# Patient Record
Sex: FEMALE | Race: WHITE
[De-identification: names, ages, dates, MRNs, and addresses within clinical notes are randomized per-mention and may not be internally consistent; named-entity substitution may affect disease eponyms.]

---

## 2019-03-13 ENCOUNTER — HOSPITAL ENCOUNTER (INPATIENT)
Dept: HOSPITAL 62 - LC | Age: 34
LOS: 1 days | Discharge: HOME | End: 2019-03-14
Attending: OBSTETRICS & GYNECOLOGY | Admitting: OBSTETRICS & GYNECOLOGY
Payer: COMMERCIAL

## 2019-03-13 DIAGNOSIS — Z28.21: ICD-10-CM

## 2019-03-13 DIAGNOSIS — D64.9: ICD-10-CM

## 2019-03-13 DIAGNOSIS — Z3A.37: ICD-10-CM

## 2019-03-13 LAB
%HYPO/RBC NFR BLD AUTO: (no result) %
ADD MANUAL DIFF: (no result)
ANISOCYTOSIS BLD QL SMEAR: (no result)
APPEARANCE UR: (no result)
APTT PPP: YELLOW S
BARBITURATES UR QL SCN: NEGATIVE
BASOPHILS # BLD AUTO: 0.1 10^3/UL (ref 0–0.2)
BASOPHILS NFR BLD AUTO: 1.1 % (ref 0–2)
BILIRUB UR QL STRIP: NEGATIVE
EOSINOPHIL # BLD AUTO: 0.1 10^3/UL (ref 0–0.6)
EOSINOPHIL NFR BLD AUTO: 0.7 % (ref 0–6)
ERYTHROCYTE [DISTWIDTH] IN BLOOD BY AUTOMATED COUNT: 17.4 % (ref 11.5–14)
GLUCOSE UR STRIP-MCNC: NEGATIVE MG/DL
HCT VFR BLD CALC: 26.3 % (ref 36–47)
HGB BLD-MCNC: 8.6 G/DL (ref 12–15.5)
KETONES UR STRIP-MCNC: NEGATIVE MG/DL
LYMPHOCYTES # BLD AUTO: 2.8 10^3/UL (ref 0.5–4.7)
LYMPHOCYTES NFR BLD AUTO: 26.7 % (ref 13–45)
MCH RBC QN AUTO: 21.2 PG (ref 27–33.4)
MCHC RBC AUTO-ENTMCNC: 32.7 G/DL (ref 32–36)
MCV RBC AUTO: 65 FL (ref 80–97)
METHADONE UR QL SCN: NEGATIVE
MONOCYTES # BLD AUTO: 1.1 10^3/UL (ref 0.1–1.4)
MONOCYTES NFR BLD AUTO: 10.6 % (ref 3–13)
NEUTROPHILS # BLD AUTO: 6.3 10^3/UL (ref 1.7–8.2)
NEUTS SEG NFR BLD AUTO: 60.9 % (ref 42–78)
NITRITE UR QL STRIP: NEGATIVE
OVALOCYTES BLD QL SMEAR: (no result)
PCP UR QL SCN: NEGATIVE
PH UR STRIP: 8 [PH] (ref 5–9)
PLATELET # BLD: 209 10^3/UL (ref 150–450)
PLATELET COMMENT: ADEQUATE
POIKILOCYTOSIS BLD QL SMEAR: (no result)
POLYCHROMASIA BLD QL SMEAR: SLIGHT
PROT UR STRIP-MCNC: 30 MG/DL
RBC # BLD AUTO: 4.06 10^6/UL (ref 3.72–5.28)
SP GR UR STRIP: 1.01
TOTAL CELLS COUNTED % (AUTO): 100 %
URINE AMPHETAMINES SCREEN: NEGATIVE
URINE BENZODIAZEPINES SCREEN: NEGATIVE
URINE COCAINE SCREEN: NEGATIVE
URINE MARIJUANA (THC) SCREEN: NEGATIVE
UROBILINOGEN UR-MCNC: NEGATIVE MG/DL (ref ?–2)
WBC # BLD AUTO: 10.3 10^3/UL (ref 4–10.5)

## 2019-03-13 PROCEDURE — 86900 BLOOD TYPING SEROLOGIC ABO: CPT

## 2019-03-13 PROCEDURE — 81005 URINALYSIS: CPT

## 2019-03-13 PROCEDURE — 80307 DRUG TEST PRSMV CHEM ANLYZR: CPT

## 2019-03-13 PROCEDURE — 4A1HXCZ MONITORING OF PRODUCTS OF CONCEPTION, CARDIAC RATE, EXTERNAL APPROACH: ICD-10-PCS | Performed by: OBSTETRICS & GYNECOLOGY

## 2019-03-13 PROCEDURE — 86901 BLOOD TYPING SEROLOGIC RH(D): CPT

## 2019-03-13 PROCEDURE — 84112 EVAL AMNIOTIC FLUID PROTEIN: CPT

## 2019-03-13 PROCEDURE — 90715 TDAP VACCINE 7 YRS/> IM: CPT

## 2019-03-13 PROCEDURE — 36415 COLL VENOUS BLD VENIPUNCTURE: CPT

## 2019-03-13 PROCEDURE — 85027 COMPLETE CBC AUTOMATED: CPT

## 2019-03-13 PROCEDURE — 86592 SYPHILIS TEST NON-TREP QUAL: CPT

## 2019-03-13 PROCEDURE — 85025 COMPLETE CBC W/AUTO DIFF WBC: CPT

## 2019-03-13 PROCEDURE — 86850 RBC ANTIBODY SCREEN: CPT

## 2019-03-13 RX ADMIN — FERROUS SULFATE TAB 325 MG (65 MG ELEMENTAL FE) SCH MG: 325 (65 FE) TAB at 17:34

## 2019-03-13 RX ADMIN — DOCUSATE SODIUM SCH MG: 100 CAPSULE, LIQUID FILLED ORAL at 17:33

## 2019-03-13 RX ADMIN — Medication SCH: at 12:38

## 2019-03-13 RX ADMIN — IBUPROFEN SCH MG: 800 TABLET, FILM COATED ORAL at 14:08

## 2019-03-13 RX ADMIN — DOCUSATE SODIUM SCH: 100 CAPSULE, LIQUID FILLED ORAL at 12:37

## 2019-03-13 RX ADMIN — FAMOTIDINE SCH MG: 20 TABLET, FILM COATED ORAL at 22:24

## 2019-03-13 RX ADMIN — SENNOSIDES, DOCUSATE SODIUM SCH: 50; 8.6 TABLET, FILM COATED ORAL at 12:38

## 2019-03-13 RX ADMIN — FERROUS SULFATE TAB 325 MG (65 MG ELEMENTAL FE) SCH: 325 (65 FE) TAB at 12:37

## 2019-03-13 RX ADMIN — FAMOTIDINE SCH: 20 TABLET, FILM COATED ORAL at 12:37

## 2019-03-13 RX ADMIN — IBUPROFEN SCH MG: 800 TABLET, FILM COATED ORAL at 22:24

## 2019-03-13 NOTE — DELIVERY SUMMARY
=================================================================

Del Sum A-C

=================================================================

Datetime Report Generated by CPN: 2019 12:19

   

   

=================================================================

DELIVERY PERSONNEL

=================================================================

   

DELIVERY PERSONNEL:  [id number]

Delivery Doctor::  Keira Elizondo MD

Labor and Delivery Nurse::  KAILA Sherwood

Labor and Delivery Nurse::  Shannon Elizondo RN

Student Observers::  Devendra Roajs RN

Scrub Tech/CNA:  Smitha Calderónillo, CST

   

=================================================================

MATERNAL INFORMATION

=================================================================

   

Delivery Anesthesia:  Epidural

Medications After Delivery:  Pitocin Drip 20 Units/1000ml NSS

Meds After Delivery Comment:  Pitocin 20 units in 1 L NS bolusing per

   order

Estimated  Blood Loss (ml):  200

Maternal Complications:  Precipitous Labor (<3hrs)

   

=================================================================

LABOR SUMMARY

=================================================================

   

EDC:  2019 00:00

No. Babies in Womb:  1

 Attempted:  No

Labor Anesthesia:  None

   

=================================================================

LABOR INFORMATION

=================================================================

   

Reason for Induction:  Not Applicable

Onset of Labor:  2019 03:00

Complete Dilatation:  2019 02:30

Oxytocin:  N/A

Group B Beta Strep:  Negative

Antibiotics # of Doses:  0

Antibiotics Time of Last Dose:  N/A

Name of Antibiotic Given:  N/A

Steroids Given:  None

Reason Steroids Not Administered:  Not Applicable

   

=================================================================

MEMBRANES

=================================================================

   

Membranes Rupture Method:  Spontaneous

Rupture of Membranes:  2019 02:30

Length of Rupture (hr):  5.32

Amniotic Fluid Color:  Clear

Amniotic Fluid Amount:  Moderate

Amniotic Fluid Odor:  Normal

   

=================================================================

STAGES OF LABOR

=================================================================

   

Stage 1 hr:  0

Stage 1 min:  -30

Stage 2 hr:  5

Stage 2 min:  19

Stage 3 hr:  0

Stage 3 min:  4

Total Time in Labor hr:  4

Total Time in Labor min:  53

   

=================================================================

VAGINAL DELIVERY

=================================================================

   

Episiotomy:  None

Laceration #1:  None

Laceration Extension #1:  N/A

Laceration Repair:  Not Applicable

Sponge Count Correct:  Yes

Sharps Count Correct:  Yes

   

=================================================================

CSECTION DELIVERY

=================================================================

   

Primary Indication:  N/A

Secondary Indication:  N/A

CSection Incidence:  N/A

Labor:  N/A

Elective:  N/A

CSection Incision:  N/A

   

=================================================================

BABY A INFORMATION

=================================================================

   

Infant Delivery Date/Time:  2019 07:49

Method of Delivery:  Vaginal

Born in Route :  No

:  N/A

Forceps:  N/A

Vacuum Extraction:  N/A

Shoulder Dystocia :  No

   

=================================================================

PRESENTATION/POSITION BABY A

=================================================================

   

Presentation:  Cephalic

Cephalic Presentation:  Vertex

Vertex Position:  Right Occipital Anterior

Breech Presentation:  N/A

   

=================================================================

PLACENTA INFORMATION BABY A

=================================================================

   

Placenta Delivery Time :  2019 07:53

Placenta Method of Delivery:  Spontaneous

Placenta Status:  Delivered

   

=================================================================

APGAR SCORES BABY A

=================================================================

   

Heart Rate 1 min:  >100 bpm

Resp Effort 1 min:  Good Cry

Reflex Irritability 1 min:  Cough or Sneeze or Pulls Away

Muscle Tone 1 min:  Active Motion

Color 1 min:  Blue/Pale

Resuscitation Effort 1 min:  Tactile Stimulation

APGAR SCORE 1 MIN:  8

Heart Rate 5 min:  >100 bpm

Resp Effort 5 min:  Good Cry

Reflex Irritability 5 min:  Cough or Sneeze or Pulls Away

Muscle Tone 5 min:  Active Motion

Color 5 min:  Body Pink, Extremities Blue

Resuscitation Effort 5 min:  N/A

APGAR SCORE 5 MIN:  9

   

=================================================================

INFANT INFORMATION BABY A

=================================================================

   

Gestational Age at Delivery:  37.6

Gestational Status:  Early Term- 37- 38.6 Weeks

Infant Outcome :  Liveborn

Infant Condition :  Stable

Infant Sex:  Female

   

=================================================================

IDENTIFICATION BABY A

=================================================================

   

Infant Verification Date/Time:  2019 08:25

ID Band Number:  [id number]

Mother's Name Verified:  Yes

Infant Medical Record Number:  [id number]

RN Verifying Infant:  lanreyolie blanton rnc

Additional Verifying Personnel:  devendra rojas rn

   

=================================================================

WEIGHT/LENGTH BABY A

=================================================================

   

Infant Birthweight (gm):  3470

Infant Weight (lb):  7

Infant Weight (oz):  10

Infant Length (in):  19.75

Infant Length (cm):  50.17

   

=================================================================

CORD INFORMATION BABY A

=================================================================

   

No. Cord Vessels:  3

Nuchal Cord :  N/A

Cord Blood Taken:  Yes-For Storage (Mom's Blood type +)

Infant Suction:  None

   

=================================================================

ASSESSMENT BABY A

=================================================================

   

Infant Complications:  None

Physical Findings at Delivery:  Within Normal Limits

Infant Respirations:  Appears Normal

Skin to Skin:  Yes

Skin to Skin Time (min):  45

Neonatologist/ALS Called :  No

Infant Care By:  ALO Elizondo RN

Transferred To:  Remains with Mother

   

=================================================================

BABY B INFORMATION

=================================================================

   

 :  N/A

   

=================================================================

SIGNATURES

=================================================================

   

:  I was personally available for consultation and serving as

   supervising physician for the MLP.

## 2019-03-13 NOTE — WARNING SIGNS IN BABIES
=================================================================

VOD Warning Signs

=================================================================

Datetime Report Generated by [facility]: 03/13/2019 08:25

   

VOD#608 -Warning Signs in Babies:  Viewed with Parent(s)/Family   

   (01/16/2019 10:30:KAILA Sherwood)

## 2019-03-13 NOTE — ADMISSION PHYSICAL
=================================================================



=================================================================

Datetime Report Generated by CPN: 2019 05:38

   

   

=================================================================

CURRENT ADMISSION

=================================================================

   

Chief Complaint:  Uterine Contractions; Suspected Ruptured Membranes

Indication for Induction:  Not Applicable

Admit Impression :  Term, Intrauterine Pregnancy; Active Labor;

   Ruptured Membranes

Admit Plan:  Admit to Unit; Initiate Labor Protocol

   

=================================================================

ALLERGIES

=================================================================

   

Medication Allergies:  No

Medication Allergies:  No Known Allergies (2019)

Latex:  Unknown

Food Allergies:  None

Environmental Allergies:  none

   

=================================================================

OBSTETRICAL HISTORY

=================================================================

   

EDC:  2019 00:00

:  4

Para:  2

Term:  0

:  2

SAB:  1

IAB:  0

Ectopic:  0

Livin

Cesareans:  0

VBACs:  0

Multiple Births:  0

Gestational Diabetes:  No

Rh Sensitization:  No

Incompetent Cervix:  No

SHEREE:  No

Infertility:  No

ART Treatment:  No

Uterine Anomaly:  No

IUGR:  No

Hx Previous C/S:  No

Macrosomia:  No

Hx Loss/Stillborn:  No

PIH:  No

Hx  Death:  No

Placenta Previa/Abruption:  No

Depression/PP Depression:  Yes

PTL/PROM:  Yes

Post Partum Hemorrhage:  No

Current Pregnancy Procedures:  Ultrasound; NST

Obstetrical History Comments:  G1- , SAB

   G2- , 34 weeks, PTL, PROM, PTD, NICU x 1 month

   G3- , 32 weeks, PTL, PROM, PTD

   G4- Current

   

=================================================================

***SEE PRENATAL RECORDS***

=================================================================

   

Alcohol:  No

Marijuana :  No

Cocaine:  No

Other Illicit Drugs:  No

Cigarettes:  Never Smoker. [id number]

   

=================================================================

MEDICAL HISTORY

=================================================================

   

Diabetes:  No

Blood Transfusion:  No

Pulmonary Disease (Asthma, TB):  No

Breast Disease:  No

Hypertension:  No

Gyn Surgery:  No

Heart Disease:  No

Hosp/Surgery:  No

Autoimmune Disorder:  No

Anesthetic Complications:  No

Kidney Disease:  No

Abnormal Pap Smear:  No

Neuro/Epilepsy:  No

Psychiatric Disorders:  No

Other Medical Diseases:  No

Hepatitis/Liver Disease:  No

Significant Family History:  No

Varicosities/Phlebitis:  No

Trauma/Violence :  No

Thyroid Dysfunction:  Yes

Medical History Comments:  Depression 2016, Thyroid elevated TSH and on

   synthroid, Hospitalizations for childbirth.

   

=================================================================

INFECTIOUS HISTORY

=================================================================

   

Gonorrhea:  No

Genital Herpes:  No

Chlamydia:  No

Tuberculosis:  No

Syphilis:  No

Hepatitis:  No

HIV/AIDS Exposure:  No

Rash or Viral Illness:  No

HPV:  No

   

=================================================================

PHYSICAL EXAM

=================================================================

   

General:  Normal

HEENT:  Normal

Neurologic:  Normal

Thyroid:  Normal

Heart:  Normal

Lungs:  Normal

Breast:  Normal

Back:  Normal

Abdomen:  Normal

Genitourinary Exam:  Normal

Extremities:  Normal

DTRs:  Normal

Pelvic Type:  Adequate

Physical Exam Comments:  pelvis proven to 5#9

Vital Signs:  Reviewed; Within Normal Limits

   

=================================================================

VAGINAL EXAM

=================================================================

   

Dilatation:  7

Effacement:  90

Station:  -1

Contraction Comments:  q4-6 mins

   

=================================================================

MEMBRANES

=================================================================

   

Pooling:  Positive

Membranes:  Ruptured

Amniotic Fluid Color:  Clear

   

=================================================================

FETUS A

=================================================================

   

EGA:  37.6

Monitoring:  External US

FHR- Baseline:  130

Variability:  Moderate 6-25bpm

Accelerations:  15X15

Decelerations:  None

FHR Category:  Category I

Estimated Fetal Weight (gm):  3500

Fetal Presentation:  Vertex

Fetal Presentation- Other:  by sono

Admit Comment:   with previous  deliveries at 19w 32w and

   34w presented at 29+6 with contractions q2 mins, painful. she was

   given procardia 10mg po, terbutaline 0.25 subcutaneously and

   betamethasone 12mg IM at 1255. c/w dr newman-pt started on magnesium

   sulfate for PTL and neurophophylaxis. after magnesium started,

   contractions spaced to 6mins apart and decreased pain. GBS culture

   sent to lab. 

   

=================================================================

PLANS FOR LABOR AND DELIVERY

=================================================================

   

Labor and Delivery:  None

Pain Management:  Natural

Feeding Preference:  Formula

Benefit of Breast Feed Discussed:  Yes

Circumcision:  N/A

   

=================================================================

INFORMED CONSENT

=================================================================

   

Assignment:  Lisa Newman MD

Signature:  Electronically signed by Keira Elizondo MD (DEWAYNE) on

   3/13/2019 at 05:37  with User ID: [email or web address]

:  Electronically signed by Keira Elizondo MD (DEWAYNE) on 3/13/2019 at

   05:37  with User ID: [email or web address]

## 2019-03-13 NOTE — WARNING SIGNS IN BABIES
=================================================================

VOD Warning Signs

=================================================================

Datetime Report Generated by [facility]: 03/13/2019 09:02

   

VOD#608 -Warning Signs in Babies:  Viewed with Parent(s)/Family   

   (03/13/2019 07:42:KAILA Sherwood)

## 2019-03-14 VITALS — DIASTOLIC BLOOD PRESSURE: 62 MMHG | SYSTOLIC BLOOD PRESSURE: 124 MMHG

## 2019-03-14 LAB
ERYTHROCYTE [DISTWIDTH] IN BLOOD BY AUTOMATED COUNT: 17.7 % (ref 11.5–14)
HCT VFR BLD CALC: 24.7 % (ref 36–47)
HGB BLD-MCNC: 7.9 G/DL (ref 12–15.5)
MCH RBC QN AUTO: 20.9 PG (ref 27–33.4)
MCHC RBC AUTO-ENTMCNC: 31.9 G/DL (ref 32–36)
MCV RBC AUTO: 66 FL (ref 80–97)
PLATELET # BLD: 153 10^3/UL (ref 150–450)
RBC # BLD AUTO: 3.77 10^6/UL (ref 3.72–5.28)
WBC # BLD AUTO: 11.9 10^3/UL (ref 4–10.5)

## 2019-03-14 PROCEDURE — 3E0234Z INTRODUCTION OF SERUM, TOXOID AND VACCINE INTO MUSCLE, PERCUTANEOUS APPROACH: ICD-10-PCS | Performed by: OBSTETRICS & GYNECOLOGY

## 2019-03-14 RX ADMIN — IBUPROFEN SCH MG: 800 TABLET, FILM COATED ORAL at 14:46

## 2019-03-14 RX ADMIN — DOCUSATE SODIUM SCH MG: 100 CAPSULE, LIQUID FILLED ORAL at 18:14

## 2019-03-14 RX ADMIN — FERROUS SULFATE TAB 325 MG (65 MG ELEMENTAL FE) SCH MG: 325 (65 FE) TAB at 18:14

## 2019-03-14 RX ADMIN — DOCUSATE SODIUM SCH MG: 100 CAPSULE, LIQUID FILLED ORAL at 09:26

## 2019-03-14 RX ADMIN — FERROUS SULFATE TAB 325 MG (65 MG ELEMENTAL FE) SCH MG: 325 (65 FE) TAB at 09:26

## 2019-03-14 RX ADMIN — FAMOTIDINE SCH MG: 20 TABLET, FILM COATED ORAL at 09:26

## 2019-03-14 RX ADMIN — IBUPROFEN SCH MG: 800 TABLET, FILM COATED ORAL at 05:16

## 2019-03-14 RX ADMIN — SENNOSIDES, DOCUSATE SODIUM SCH EACH: 50; 8.6 TABLET, FILM COATED ORAL at 09:27

## 2019-03-14 RX ADMIN — Medication SCH CAP: at 09:26

## 2019-03-14 NOTE — PDOC PROGRESS REPORT
Subjective-OB


Progress Note for:: 19 - PP day #1, A+ Rubella Immune, bottlefeeding, 

anemia postpartum. Pt refused iron transfussion ordered by Dr Johnson today.


Subjective: 





Pt states she would like to go home today





Physical Exam (OB)


Vital Signs: 


                                        











Temp Pulse Resp BP Pulse Ox


 


 98.5 F   65   16   117/75   98 


 


 19 08:01  19 08:01  19 08:01  19 08:01  19 08:01








                                 Intake & Output











 03/13/19 03/14/19 03/15/19





 06:59 06:59 06:59


 


Weight 83.5 kg  














- General


General Appearance: Appears well, Alert


In distress: None





- PIH/Pre-Eclampsia


DTR's: 2 +


Clonus: Negative


Headache: Absent


Epigastric Pain: No


Visual Changes: No





- Lochia


Lochia Amount: Small 10-25 ml


Lochia Color: Rubra/Red





- Abdomen


Description: Soft, Round


Hernia Present: No


Fundal Description: Firm, Midline


Fundal Height: u/u - u/2





- Respiratory


Respiratory Status: No respiratory distress





- Abdominal


Inspection: Normal


Distension: No distension





- Genitourinary


Genitourinary Note: 





voiding





- Extremities


Upper extremity: Normal inspection


Lower extremities: Normal inspection





- Neurological


Cognition: Normal


Orientation: AAOx4





- Psychological


Associated symptoms: Normal affect, Normal mood





- Skin


Skin Temperature: Warm


Skin Moisture: Dry





Objective-Diagnostic


Laboratory: 


                                        





                                 19 07:20 





                                        











  19





  07:20


 


WBC  11.9 H


 


RBC  3.77


 


Hgb  7.9 L


 


Hct  24.7 L


 


MCV  66 L


 


MCH  20.9 L


 


MCHC  31.9 L


 


RDW  17.7 H


 


Plt Count  153














Assessment and Plan(PN)





- Assessment and Plan


(1)  (normal spontaneous vaginal delivery)


Is this a current diagnosis for this admission?: Yes   





(2) Anemia affecting pregnancy


Qualifiers: 


   Trimester: third trimester   Qualified Code(s): O99.013 - Anemia complicating

pregnancy, third trimester   


Is this a current diagnosis for this admission?: Yes   





- Time Spent with Patient


Time with patient: Less than 15 minutes - Will not send pt home today since no 

iron infusion was done. Will see how pt tolerates her low hemaglobin today, then

plan to d/c home tomorrow. Plan reviewed w/ Dr Johnson.


Medications reviewed and adjusted accordingly: Yes





- Disposition


Anticipated Discharge: Home


Within: within 24 hours

## 2019-03-18 NOTE — PDOC DISCHARGE SUMMARY
General





- Admit/Disc Date/PCP


Admission Date/Primary Care Provider: 


  19 04:32





  





Discharge Date: 19





- Discharge Diagnosis


(1) Anemia affecting pregnancy


Is this a current diagnosis for this admission?: Yes   





(2)  (normal spontaneous vaginal delivery)


Is this a current diagnosis for this admission?: Yes   





(3)  uterine contractions in second trimester, antepartum


Is this a current diagnosis for this admission?: No   





- Additional Information


Discharge Diet: Regular


Discharge Activity: Activity As Tolerated, No Lifting Over 10 Pounds, No Lifting

/Push/Pulling


Home Medications: 








Levothyroxine Sodium [Synthroid 0.075 mg Tablet] 0.075 mg PO DAILY 19 


Pantoprazole Sodium [Protonix] 40 mg PO DAILY 19 


Prenatal Vit No.130/Iron/Folic [Prenatal Vitamins] 1 each PO DAILY 19 











History of Present Illness


Patient complains of: contractions


History of Present Illness: 


KIRA SAHA is a 33 year old  presented to L&D c/o contractions. She 

has an IUP a4 37.6 weeks.  She believes that her water may have broken.








Physical Exam





- Physical Exam


Vital Signs: 


                                        











Temp Pulse Resp BP Pulse Ox


 


 98.5 F   65   16   124/62   98 


 


 19 20:35  19 20:35  19 20:35  19 20:35  19 20:35











General appearance: PRESENT: no acute distress


Respiratory exam: PRESENT: clear to auscultation sarwat


Cardiovascular exam: PRESENT: RRR, tachycardia


GI/Abdominal exam: PRESENT: normal bowel sounds, soft


Musculoskeletal exam: ABSENT: ambulatory, deformity, dislocation, full ROM, 

normal inspection, tenderness, other





Result


Laboratory Results: 


                                        





                                 19 07:20 











Plan


Discharge Plan: 


1.  Discharge home


 2.  Continue iron





Time Spent: Less than 30 Minutes

## 2020-06-24 ENCOUNTER — HOSPITAL ENCOUNTER (INPATIENT)
Dept: HOSPITAL 62 - LC | Age: 35
LOS: 2 days | Discharge: HOME | End: 2020-06-26
Attending: OBSTETRICS & GYNECOLOGY | Admitting: OBSTETRICS & GYNECOLOGY
Payer: MEDICAID

## 2020-06-24 DIAGNOSIS — F41.8: ICD-10-CM

## 2020-06-24 DIAGNOSIS — Z3A.36: ICD-10-CM

## 2020-06-24 LAB
%HYPO/RBC NFR BLD AUTO: SLIGHT %
ADD MANUAL DIFF: (no result)
ANISOCYTOSIS BLD QL SMEAR: (no result)
APPEARANCE UR: (no result)
APTT PPP: YELLOW S
BARBITURATES UR QL SCN: NEGATIVE
BASOPHILS # BLD AUTO: 0.1 10^3/UL (ref 0–0.2)
BASOPHILS NFR BLD AUTO: 0.9 % (ref 0–2)
BILIRUB UR QL STRIP: NEGATIVE
CHLAM PCR: NOT DETECTED
EOSINOPHIL # BLD AUTO: 0.1 10^3/UL (ref 0–0.6)
EOSINOPHIL NFR BLD AUTO: 0.8 % (ref 0–6)
ERYTHROCYTE [DISTWIDTH] IN BLOOD BY AUTOMATED COUNT: 17.1 % (ref 11.5–14)
GLUCOSE UR STRIP-MCNC: NEGATIVE MG/DL
HCT VFR BLD CALC: 27.1 % (ref 36–47)
HGB BLD-MCNC: 8.5 G/DL (ref 12–15.5)
KETONES UR STRIP-MCNC: NEGATIVE MG/DL
LYMPHOCYTES # BLD AUTO: 1.7 10^3/UL (ref 0.5–4.7)
LYMPHOCYTES NFR BLD AUTO: 15.9 % (ref 13–45)
MCH RBC QN AUTO: 20.2 PG (ref 27–33.4)
MCHC RBC AUTO-ENTMCNC: 31.5 G/DL (ref 32–36)
MCV RBC AUTO: 64 FL (ref 80–97)
METHADONE UR QL SCN: NEGATIVE
MONOCYTES # BLD AUTO: 0.9 10^3/UL (ref 0.1–1.4)
MONOCYTES NFR BLD AUTO: 8.5 % (ref 3–13)
NEUTROPHILS # BLD AUTO: 7.8 10^3/UL (ref 1.7–8.2)
NEUTS SEG NFR BLD AUTO: 73.9 % (ref 42–78)
NITRITE UR QL STRIP: NEGATIVE
OVALOCYTES BLD QL SMEAR: SLIGHT
PCP UR QL SCN: NEGATIVE
PH UR STRIP: 7 [PH] (ref 5–9)
PLATELET # BLD: 215 10^3/UL (ref 150–450)
PLATELET COMMENT: ADEQUATE
POIKILOCYTOSIS BLD QL SMEAR: SLIGHT
POLYCHROMASIA BLD QL SMEAR: SLIGHT
PROT UR STRIP-MCNC: 30 MG/DL
RBC # BLD AUTO: 4.22 10^6/UL (ref 3.72–5.28)
SP GR UR STRIP: 1.01
TOTAL CELLS COUNTED % (AUTO): 100 %
URINE AMPHETAMINES SCREEN: NEGATIVE
URINE BENZODIAZEPINES SCREEN: NEGATIVE
URINE COCAINE SCREEN: NEGATIVE
URINE MARIJUANA (THC) SCREEN: NEGATIVE
UROBILINOGEN UR-MCNC: NEGATIVE MG/DL (ref ?–2)
WBC # BLD AUTO: 10.5 10^3/UL (ref 4–10.5)

## 2020-06-24 PROCEDURE — 94760 N-INVAS EAR/PLS OXIMETRY 1: CPT

## 2020-06-24 PROCEDURE — 85027 COMPLETE CBC AUTOMATED: CPT

## 2020-06-24 PROCEDURE — 96372 THER/PROPH/DIAG INJ SC/IM: CPT

## 2020-06-24 PROCEDURE — 82746 ASSAY OF FOLIC ACID SERUM: CPT

## 2020-06-24 PROCEDURE — 88307 TISSUE EXAM BY PATHOLOGIST: CPT

## 2020-06-24 PROCEDURE — 86850 RBC ANTIBODY SCREEN: CPT

## 2020-06-24 PROCEDURE — 84466 ASSAY OF TRANSFERRIN: CPT

## 2020-06-24 PROCEDURE — 82728 ASSAY OF FERRITIN: CPT

## 2020-06-24 PROCEDURE — 87491 CHLMYD TRACH DNA AMP PROBE: CPT

## 2020-06-24 PROCEDURE — 81005 URINALYSIS: CPT

## 2020-06-24 PROCEDURE — 86592 SYPHILIS TEST NON-TREP QUAL: CPT

## 2020-06-24 PROCEDURE — 80307 DRUG TEST PRSMV CHEM ANLYZR: CPT

## 2020-06-24 PROCEDURE — 86900 BLOOD TYPING SEROLOGIC ABO: CPT

## 2020-06-24 PROCEDURE — 86901 BLOOD TYPING SEROLOGIC RH(D): CPT

## 2020-06-24 PROCEDURE — 85025 COMPLETE CBC W/AUTO DIFF WBC: CPT

## 2020-06-24 PROCEDURE — 87591 N.GONORRHOEAE DNA AMP PROB: CPT

## 2020-06-24 PROCEDURE — 83540 ASSAY OF IRON: CPT

## 2020-06-24 PROCEDURE — 85045 AUTOMATED RETICULOCYTE COUNT: CPT

## 2020-06-24 PROCEDURE — 84112 EVAL AMNIOTIC FLUID PROTEIN: CPT

## 2020-06-24 PROCEDURE — 87081 CULTURE SCREEN ONLY: CPT

## 2020-06-24 PROCEDURE — 36415 COLL VENOUS BLD VENIPUNCTURE: CPT

## 2020-06-24 PROCEDURE — 83550 IRON BINDING TEST: CPT

## 2020-06-24 PROCEDURE — 82607 VITAMIN B-12: CPT

## 2020-06-24 RX ADMIN — FAMOTIDINE SCH MG: 20 TABLET, FILM COATED ORAL at 21:41

## 2020-06-24 RX ADMIN — IBUPROFEN SCH MG: 800 TABLET, FILM COATED ORAL at 21:41

## 2020-06-24 RX ADMIN — FERROUS SULFATE TAB 325 MG (65 MG ELEMENTAL FE) SCH: 325 (65 FE) TAB at 18:09

## 2020-06-24 RX ADMIN — DOCUSATE SODIUM SCH: 100 CAPSULE, LIQUID FILLED ORAL at 18:09

## 2020-06-24 NOTE — DELIVERY SUMMARY
=================================================================

Del Sum A-C

=================================================================

Datetime Report Generated by CPN: 2020 18:54

   

   

=================================================================

DELIVERY PERSONNEL

=================================================================

   

DELIVERY PERSONNEL:  [id number]

Delivery Doctor::  Skylar Grant, CNALO

Labor and Delivery Nurse::  Michael Cochran, RN

Labor and Delivery Nurse::  Ana Pedro RNC

Nursery Nurse::  Deana Hinojosa RN

Nursery Nurse::  Sivan Santiago 

Scrub Tech/CNA:  Connie Driscoll CNA II

Scrub Tech/CNA:  Bisi Tubbs, CST

Additional Personnel: :  Saman Harris, RN

   

=================================================================

MATERNAL INFORMATION

=================================================================

   

Delivery Anesthesia:  Epidural

Medications After Delivery:  Pitocin 10 Units IM

Estimated  Blood Loss (ml):  25

Delivery QBL:  25

Maternal Complications:  None

Provider Comments:  pt with strong urge to push after epidural

   placement and found to be c/c/1, continued pushing and quickly

   delivered a viable baby boy through nuchal cord x1. Baby with

   vigorous respiratory effort and cry with tactile stimulation and

   placed on maternal abdomen skin to skin. Cord allowed to stop

   pulsating then clamped x2 and cut by FOB (3vc noted). Placenta

   delivered spontaneously intact and sent to lab for eval. Vaginal and

   perineal inspection revealed no lacerations. Mother and Baby remain

   skin to skin, stable and bonding at this time. Nursery in room for

   delivery

   

=================================================================

LABOR SUMMARY

=================================================================

   

EDC:  2020 00:00

No. Babies in Womb:  1

 Attempted:  No

Labor Anesthesia:  Epidural

   

=================================================================

LABOR INFORMATION

=================================================================

   

Reason for Induction:  Not Applicable

Onset of Labor:  2020 15:45

Complete Dilatation:  2020 15:58

Oxytocin:  Augmentation

Group B Beta Strep:  positive

Antibiotics # of Doses:  2

Antibiotics Time of Last Dose:  1540

Name of Antibiotic Given:  penicillin

Steroids Given:  Partial Course

Reason Steroids Not Administered:  Imminent Delivery

   

=================================================================

MEMBRANES

=================================================================

   

Membranes Rupture Method:  Spontaneous

Rupture of Membranes:  2020 03:00

Length of Rupture (hr):  13.50

Amniotic Fluid Color:  Clear (Annotations: Per patient.)

   

=================================================================

STAGES OF LABOR

=================================================================

   

Stage 1 hr:  0

Stage 1 min:  13

Stage 2 hr:  0

Stage 2 min:  32

Stage 3 hr:  0

Stage 3 min:  5

Total Time in Labor hr:  0

Total Time in Labor min:  50

   

=================================================================

VAGINAL DELIVERY

=================================================================

   

Episiotomy:  None

Laceration #1:  None

Laceration Extension #1:  N/A

Laceration #2:  None

Other Laceration:  n/a

Laceration Repair:  Not Applicable

Sponge Count Correct:  N/A

Sharps Count Correct:  N/A

   

=================================================================

CSECTION DELIVERY

=================================================================

   

Primary Indication:  N/A

Secondary Indication:  N/A

CSection Incidence:  N/A

Labor:  N/A

Elective:  N/A

   

=================================================================

BABY A INFORMATION

=================================================================

   

Infant Delivery Date/Time:  2020 16:30

Method of Delivery:  Vaginal

Nurse Controlled Delivery:  No

Born in Route :  No

:  N/A

Forceps:  N/A

Vacuum Extraction:  N/A

Shoulder Dystocia :  No

   

=================================================================

PRESENTATION/POSITION BABY A

=================================================================

   

Presentation:  Cephalic

Cephalic Presentation:  Vertex

Vertex Position:  Right Occipital Posterior

Breech Presentation:  N/A

   

=================================================================

PLACENTA INFORMATION BABY A

=================================================================

   

Placenta Delivery Time :  2020 16:35

Placenta Method of Delivery:  Spontaneous

Placenta Status:  Delivered

   

=================================================================

APGAR SCORES BABY A

=================================================================

   

Heart Rate 1 min:  >100 bpm

Resp Effort 1 min:  Good Cry

Reflex Irritability 1 min:  Cough or Sneeze or Pulls Away

Muscle Tone 1 min:  Some Flexion of Extremities

Color 1 min:  Blue/Pale

Resuscitation Effort 1 min:  Tactile Stimulation

APGAR SCORE 1 MIN:  7

Heart Rate 5 min:  >100 bpm

Resp Effort 5 min:  Good Cry

Reflex Irritability 5 min:  Cough or Sneeze or Pulls Away

Muscle Tone 5 min:  Active Motion

Color 5 min:  Body Pink, Extremities Blue

APGAR SCORE 5 MIN:  9

   

=================================================================

INFANT INFORMATION BABY A

=================================================================

   

Gestational Age at Delivery:  36.2

Gestational Status:  Late - 34- 36.6 Weeks

Infant Outcome :  Liveborn

Infant Condition :  Stable

Infant Sex:  Male

   

=================================================================

IDENTIFICATION BABY A

=================================================================

   

Infant Verification Date/Time:  2020 16:42

ID Band Number:  [id number]

Mother's Name Verified:  Yes

Infant Medical Record Number:  [id number]

RN Verifying Infant:  Saman Harris, CADEN

Additional Verifying Personnel:  Michael Cochran RN

   

=================================================================

WEIGHT/LENGTH BABY A

=================================================================

   

Infant Birthweight (gm):  3096

Infant Weight (lb):  6

Infant Weight (oz):  13

Infant Length (in):  19.00

Infant Length (cm):  48.26

   

=================================================================

CORD INFORMATION BABY A

=================================================================

   

No. Cord Vessels:  3

Nuchal Cord :  Around Neck x1, Loose

Cord Blood Taken:  Yes-For Storage (Mom's Blood type +)

Infant Suction:  Mouth; Nose

   

=================================================================

ASSESSMENT BABY A

=================================================================

   

Physical Findings at Delivery:  Within Normal Limits

Infant Respirations:  Appears Normal

Skin to Skin:  Yes

Skin to Skin Time (min):  60

Neonatologist/ALS Called :  No

Infant Care By:  lbursey

Transferred To:   Nursery

   

=================================================================

BABY B INFORMATION

=================================================================

   

 :  N/A

   

=================================================================

SIGNATURES

=================================================================

   

Assignment:  Minerva Terrell MD

Signature:  Electronically signed by Skylar Grant CNM on

   2020 at 17:02  with User ID: [email or web address]

:  Electronically signed by Skylar Grant CNM on 2020 at

   17:02  with User ID: [email or web address]

## 2020-06-24 NOTE — ADMISSION PHYSICAL
=================================================================



=================================================================

Datetime Report Generated by CPN: 2020 11:07

   

   

=================================================================

CURRENT ADMISSION

=================================================================

   

Chief Complaint:  Suspected Ruptured Membranes

Indication for Induction:  Not Applicable

Admit Impression :  , Intrauterine Pregnancy; No Active Labor;

   Ruptured Membranes

Admit Plan:  Admit to Unit; Initiate  Labor Protocol

   

=================================================================

ALLERGIES

=================================================================

   

Medication Allergies:  No Known Allergies (2019)

   

=================================================================

OBSTETRICAL HISTORY

=================================================================

   

EDC:  2020 00:00

:  5

Para:  3

:  3

SAB:  1

Living:  3

   

=================================================================

PHYSICAL EXAM

=================================================================

   

General:  Normal

HEENT:  Normal

Neurologic:  Normal

Thyroid:  Deferred

Heart:  Normal

Lungs:  Normal

Breast:  Deferred

Back:  Normal

Abdomen:  Normal

Genitourinary Exam:  Normal

Extremities:  Normal

DTRs:  Normal

Pelvic Type:  Adequate

Vital Signs:  Reviewed

   

=================================================================

VAGINAL EXAM

=================================================================

   

Dilatation:  3

Effacement:  50

Station:  -2

Contraction Comments:  q 2-5

   

=================================================================

MEMBRANES

=================================================================

   

Membranes:  Ruptured

Amniotic Fluid Color:  Clear

   

=================================================================

FETUS A

=================================================================

   

EGA:  36.2

Monitoring:  External US

FHR- Baseline:  130

Variability:  Moderate 6-25bpm

Accelerations:  15X15

Decelerations:  None

Fetal Presentation:  Vertex

Admit Comment:  35yo  at 36+2ega presents for LOF at approx 0300

   this am.  Clear fluid.  GBS unknown.  GBS and GC/CT collected. 

   Actimprom positive.  H/o PPROM x 2 with PTD x 2.  PCN for GBS

   unknown.  NUrsery notified.  Cvx favorable.  Will begin Pitocin and

   PCN.  Hypothyroid - on synthroid.  Admit and anticipate .  1hr

   .  H/o anxiety and depression on Wellbutrin - not currently

   on meds.  Will place discharge planner.

   

=================================================================

INFORMED CONSENT

=================================================================

   

Informed Consent Obtained:  Vaginal Delivery; Risks, Benefits and

   Alternatives Discussed

Signature:  Electronically signed by Minerva Terrell MD ([facility]) on

   2020 at 11:06  with User ID: [email or web address]

## 2020-06-24 NOTE — WARNING SIGNS IN BABIES
=================================================================

VOD Warning Signs

=================================================================

Datetime Report Generated by N: 06/24/2020 14:17

   

VOD#608 -Warning Signs in Babies:  Viewed with Parent(s)/Family   

   (06/24/2020 14:16:Michael Cochran RN)

## 2020-06-24 NOTE — WARNING SIGNS IN BABIES
=================================================================

VOD Warning Signs

=================================================================

Datetime Report Generated by N: 06/24/2020 14:15

   

VOD#608 -Warning Signs in Babies:  Viewed with Parent(s)/Family   

   (06/24/2020 10:31:Michael Cochran RN)

## 2020-06-25 LAB
ABSOLUTE RETICS #: 0.07 10^6/UL (ref 0.03–0.12)
ERYTHROCYTE [DISTWIDTH] IN BLOOD BY AUTOMATED COUNT: 17.3 % (ref 11.5–14)
FERRITIN SERPL-MCNC: 4.27 NG/ML (ref 6.2–137)
FOLATE SERPL-MCNC: 13.7 NG/ML (ref 2.76–?)
HCT VFR BLD CALC: 25 % (ref 36–47)
HGB BLD-MCNC: 7.8 G/DL (ref 12–15.5)
IRON(TIBC): < 10.1 UG/DL (ref 37–170)
MCH RBC QN AUTO: 19.8 PG (ref 27–33.4)
MCHC RBC AUTO-ENTMCNC: 31.1 G/DL (ref 32–36)
MCV RBC AUTO: 64 FL (ref 80–97)
PATH REV BLD -IMP: (no result)
PLATELET # BLD: 214 10^3/UL (ref 150–450)
RBC # BLD AUTO: 3.92 10^6/UL (ref 3.72–5.28)
RETICULOCYTE COUNT (AUTO): 1.8 % (ref 0.66–2.85)
WBC # BLD AUTO: 19.8 10^3/UL (ref 4–10.5)

## 2020-06-25 RX ADMIN — DOCUSATE SODIUM SCH MG: 100 CAPSULE, LIQUID FILLED ORAL at 17:37

## 2020-06-25 RX ADMIN — FAMOTIDINE SCH MG: 20 TABLET, FILM COATED ORAL at 09:51

## 2020-06-25 RX ADMIN — DOCUSATE SODIUM SCH MG: 100 CAPSULE, LIQUID FILLED ORAL at 09:51

## 2020-06-25 RX ADMIN — LEVOTHYROXINE SODIUM SCH: 75 TABLET ORAL at 13:32

## 2020-06-25 RX ADMIN — FERROUS SULFATE TAB 325 MG (65 MG ELEMENTAL FE) SCH MG: 325 (65 FE) TAB at 17:37

## 2020-06-25 RX ADMIN — SENNOSIDES, DOCUSATE SODIUM SCH EACH: 50; 8.6 TABLET, FILM COATED ORAL at 09:51

## 2020-06-25 RX ADMIN — FAMOTIDINE SCH MG: 20 TABLET, FILM COATED ORAL at 23:07

## 2020-06-25 RX ADMIN — IBUPROFEN SCH MG: 800 TABLET, FILM COATED ORAL at 13:31

## 2020-06-25 RX ADMIN — IBUPROFEN SCH MG: 800 TABLET, FILM COATED ORAL at 06:01

## 2020-06-25 RX ADMIN — Medication SCH CAP: at 09:51

## 2020-06-25 RX ADMIN — FERROUS SULFATE TAB 325 MG (65 MG ELEMENTAL FE) SCH MG: 325 (65 FE) TAB at 09:51

## 2020-06-25 RX ADMIN — IBUPROFEN SCH MG: 800 TABLET, FILM COATED ORAL at 23:07

## 2020-06-25 NOTE — PDOC PROGRESS REPORT
Subjective-OB


Progress Note for:: 06/25/20


Subjective: 





reports bleeding slowing,pain controlled with current meds, denies needs





Physical Exam (OB)


Vital Signs: 


                                        











Temp Pulse Resp BP Pulse Ox


 


 97.8 F   64   16   117/63   99 


 


 06/25/20 07:49  06/25/20 07:49  06/25/20 07:49  06/25/20 07:49  06/25/20 07:49








                                 Intake & Output











 06/24/20 06/25/20 06/26/20





 06:59 06:59 06:59


 


Intake Total  1000 240


 


Balance  1000 240


 


Weight  86 kg 














- Abdomen


Description: Soft


Fundal Description: Firm, Midline


Fundal Height: u/u - u/2





- Abdominal


Distension: No distension


Tenderness: Nontender





- Extremities


Lower extremities: Sal's sign - neg


Calf: Normal, Nontender





Objective-Diagnostic


Laboratory: 


                                        





                                 06/25/20 06:37 





                                        











  06/25/20 06/25/20 06/25/20





  06:37 06:37 06:37


 


WBC  19.8 H  


 


RBC  3.92  


 


Hgb  7.8 L  


 


Hct  25.0 L  


 


MCV  64 L  


 


MCH  19.8 L  


 


MCHC  31.1 L  


 


RDW  17.3 H  


 


Plt Count  214  


 


Retic Count (auto)  1.80  


 


Iron   < 10.1 L 


 


TIBC   628 H 


 


Transferrin    528.24 H


 


Ferritin   4.27 L 


 


Vitamin B12   181.0 L 


 


Folate   13.70 














Assessment and Plan(PN)





- Time Spent with Patient


Time with patient: Less than 15 minutes





- Disposition


Anticipated Discharge: Home


Within: within 24 hours

## 2020-06-26 VITALS — SYSTOLIC BLOOD PRESSURE: 111 MMHG | DIASTOLIC BLOOD PRESSURE: 56 MMHG

## 2020-06-26 RX ADMIN — FERROUS SULFATE TAB 325 MG (65 MG ELEMENTAL FE) SCH MG: 325 (65 FE) TAB at 09:40

## 2020-06-26 RX ADMIN — DOCUSATE SODIUM SCH MG: 100 CAPSULE, LIQUID FILLED ORAL at 09:40

## 2020-06-26 RX ADMIN — IBUPROFEN SCH: 800 TABLET, FILM COATED ORAL at 13:45

## 2020-06-26 RX ADMIN — Medication SCH CAP: at 09:40

## 2020-06-26 RX ADMIN — IBUPROFEN SCH MG: 800 TABLET, FILM COATED ORAL at 05:31

## 2020-06-26 RX ADMIN — LEVOTHYROXINE SODIUM SCH MG: 75 TABLET ORAL at 05:29

## 2020-06-26 RX ADMIN — SENNOSIDES, DOCUSATE SODIUM SCH EACH: 50; 8.6 TABLET, FILM COATED ORAL at 09:40

## 2020-06-26 RX ADMIN — FAMOTIDINE SCH MG: 20 TABLET, FILM COATED ORAL at 09:40

## 2020-06-26 NOTE — PDOC DISCHARGE SUMMARY
Impression





- Admit/DC Date/PCP


Admission Date/Primary Care Provider: 


  20 11:05





  JANUSZ ZAMBRANO MD





Discharge Date: 20 - PP Day #2, doing well, no complaints, A+, Rubella 

Immune, bottlefeeding





- Discharge Diagnosis


(1) 36 weeks gestation of pregnancy


Is this a current diagnosis for this admission?: Yes   





(2) Anxiety and depression


Is this a current diagnosis for this admission?: Yes   





(3) Microcytic anemia


Is this a current diagnosis for this admission?: Yes   





(4)  delivery


Is this a current diagnosis for this admission?: Yes   





(5)  premature rupture of membranes


Is this a current diagnosis for this admission?: Yes   








- Additional Information


Resuscitation Status: Full Code


Discharge Diet: As Tolerated, Regular


Discharge Activity: Activity As Tolerated, No Lifting Over 10 Pounds, Pelvic 

Rest


Referrals: 


JANUSZ ZAMBRANO MD [Primary Care Provider] - 


Prescriptions: 


Ferrous Sulfate [Feosol 325 mg Tablet] 325 mg PO BID #60 tablet


Ibuprofen [Motrin 800 mg Tablet] 800 mg PO Q8 #60 tablet


Home Medications: 








Levothyroxine Sodium [Synthroid 0.075 mg Tablet] 0.075 mg PO DAILY 19 


Prenatal Vit No.130/Iron/Folic [Prenatal Tablet] 1 each PO DAILY 19 


Ferrous Sulfate [Feosol 325 mg Tablet] 325 mg PO BID #60 tablet 20 


Ibuprofen [Motrin 800 mg Tablet] 800 mg PO Q8 #60 tablet 20 











HPI


Reason(s) for Admission: Onset of Labor,  Labor


Prenatal Procedures: Ultrasound


Intrapartum Procedure(s): Spontaneous Vaginal Delivery





Results


Laboratory Results: 


                                        











WBC  19.8 10^3/uL (4.0-10.5)  H  20  06:37    


 


RBC  3.92 10^6/uL (3.72-5.28)   20  06:37    


 


Hgb  7.8 g/dL (12.0-15.5)  L  20  06:37    


 


Hct  25.0 % (36.0-47.0)  L  20  06:37    


 


MCV  64 fl (80-97)  L  20  06:37    


 


MCH  19.8 pg (27.0-33.4)  L  20  06:37    


 


MCHC  31.1 g/dL (32.0-36.0)  L  20  06:37    


 


RDW  17.3 % (11.5-14.0)  H  20  06:37    


 


Plt Count  214 10^3/uL (150-450)   20  06:37    


 


Lymph % (Auto)  15.9 % (13-45)   20  11:28    


 


Mono % (Auto)  8.5 % (3-13)   20  11:28    


 


Eos % (Auto)  0.8 % (0-6)   20  11:28    


 


Baso % (Auto)  0.9 % (0-2)   20  11:28    


 


Reticulocyte #  0.070 10^6/uL (0.028-0.122)   20  06:37    


 


Absolute Neuts (auto)  7.8 10^3/uL (1.7-8.2)   20  11:28    


 


Absolute Lymphs (auto)  1.7 10^3/uL (0.5-4.7)   20  11:28    


 


Absolute Monos (auto)  0.9 10^3/uL (0.1-1.4)   20  11:28    


 


Absolute Eos (auto)  0.1 10^3/uL (0.0-0.6)   20  11:28    


 


Absolute Basos (auto)  0.1 10^3/uL (0.0-0.2)   20  11:28    


 


Seg Neutrophils %  73.9 % (42-78)   20  11:28    


 


Platelet Comment  ADEQUATE   20  11:28    


 


Polychromasia  SLIGHT   20  11:28    


 


Hypochromasia  SLIGHT   20  11:28    


 


Poikilocytosis  SLIGHT   20  11:28    


 


Anisocytosis  1+   20  11:28    


 


Microcytosis  3+   20  11:28    


 


Ovalocytes  SLIGHT   20  11:28    


 


Retic Count (auto)  1.80 % (0.66-2.85)   20  06:37    


 


Iron  < 10.1 ug/dL ()  L  20  06:37    


 


TIBC  628 ug/dL (250-450)  H  20  06:37    


 


Iron Saturation  UNABLE TO CALCULATE % (15% - 50%)   20  06:37    


 


Transferrin  528.24 mg/dL (206..00)  H  20  06:37    


 


Ferritin  4.27 ng/mL (6.2-137.0)  L  20  06:37    


 


Vitamin B12  181.0 pg/mL (239-931)  L  20  06:37    


 


Folate  13.70 ng/mL (>2.76)   20  06:37    


 


Urine Color  YELLOW   20  10:29    


 


Urine Appearance  CLOUDY   20  10:29    


 


Urine pH  7.0  (5.0-9.0)   20  10:29    


 


Ur Specific Gravity  1.014   20  10:29    


 


Urine Protein  30 mg/dL (NEGATIVE)  H  20  10:29    


 


Urine Glucose (UA)  NEGATIVE mg/dL (NEGATIVE)   20  10:29    


 


Urine Ketones  NEGATIVE mg/dL (NEGATIVE)   20  10:29    


 


Urine Blood  MODERATE  (NEGATIVE)  H  20  10:29    


 


Urine Nitrite  NEGATIVE  (NEGATIVE)   20  10:29    


 


Urine Bilirubin  NEGATIVE  (NEGATIVE)   20  10:29    


 


Urine Urobilinogen  NEGATIVE mg/dL (<2.0)   20  10:29    


 


Ur Leukocyte Esterase  SMALL  (NEGATIVE)  H  20  10:29    


 


Urine Ascorbic Acid  NEGATIVE  (NEGATIVE)   20  10:29    


 


Fetal Membranes Rupture  POSITIVE  (NEGATIVE)  H  20  10:29    


 


Urine Opiates Screen  NEGATIVE   20  10:29    


 


Urine Methadone Screen  NEGATIVE   20  10:29    


 


Ur Barbiturates Screen  NEGATIVE   20  10:29    


 


Ur Phencyclidine Scrn  NEGATIVE   20  10:29    


 


Ur Amphetamines Screen  NEGATIVE   20  10:29    


 


U Benzodiazepines Scrn  NEGATIVE   20  10:29    


 


Urine Cocaine Screen  NEGATIVE   20  10:29    


 


U Marijuana (THC) Screen  NEGATIVE   20  10:29    


 


RPR  NONREACTIVE  (NONREACTIVE)   20  11:28    


 


Chlamydia DNA (PCR)  NOT DETECTED  (NOT DETECT)   20  10:09    


 


N.gonorrhoeae DNA (PCR)  NOT DETECTED  (NOT DETECT)   20  10:09    


 


Slides for Path Review  SEE COMMENT   20  11:28    


 


Blood Type  A POSITIVE   20  11:28    


 


Antibody Screen  NEGATIVE   20  11:28    














Plan


Plan of Treatment: 


Iron rich foods, d/c home, f/up with [facility] in 4 wks


Time Spent: Less than 30 Minutes